# Patient Record
Sex: MALE | Race: OTHER | HISPANIC OR LATINO | Employment: FULL TIME | URBAN - METROPOLITAN AREA
[De-identification: names, ages, dates, MRNs, and addresses within clinical notes are randomized per-mention and may not be internally consistent; named-entity substitution may affect disease eponyms.]

---

## 2019-05-15 ENCOUNTER — NON-PROVIDER VISIT (OUTPATIENT)
Dept: OCCUPATIONAL MEDICINE | Facility: CLINIC | Age: 29
End: 2019-05-15
Payer: COMMERCIAL

## 2019-05-15 ENCOUNTER — HOSPITAL ENCOUNTER (EMERGENCY)
Facility: MEDICAL CENTER | Age: 29
End: 2019-05-15
Attending: EMERGENCY MEDICINE
Payer: COMMERCIAL

## 2019-05-15 VITALS
WEIGHT: 165.34 LBS | OXYGEN SATURATION: 96 % | HEIGHT: 66 IN | BODY MASS INDEX: 26.57 KG/M2 | DIASTOLIC BLOOD PRESSURE: 95 MMHG | SYSTOLIC BLOOD PRESSURE: 142 MMHG | RESPIRATION RATE: 14 BRPM | TEMPERATURE: 98.8 F | HEART RATE: 55 BPM

## 2019-05-15 DIAGNOSIS — Z02.83 ENCOUNTER FOR DRUG SCREENING: ICD-10-CM

## 2019-05-15 DIAGNOSIS — Z02.1 PRE-EMPLOYMENT DRUG SCREENING: ICD-10-CM

## 2019-05-15 DIAGNOSIS — T75.4XXA ELECTROCUTION AND NONFATAL EFFECTS OF ELECTRIC CURRENT, INITIAL ENCOUNTER: ICD-10-CM

## 2019-05-15 LAB
AMP AMPHETAMINE: NORMAL
ANION GAP SERPL CALC-SCNC: 11 MMOL/L (ref 0–11.9)
BREATH ALCOHOL COMMENT: NORMAL
BUN SERPL-MCNC: 16 MG/DL (ref 8–22)
CALCIUM SERPL-MCNC: 9.7 MG/DL (ref 8.5–10.5)
CHLORIDE SERPL-SCNC: 105 MMOL/L (ref 96–112)
CK SERPL-CCNC: 231 U/L (ref 0–154)
CO2 SERPL-SCNC: 25 MMOL/L (ref 20–33)
COC COCAINE: NORMAL
CREAT SERPL-MCNC: 0.81 MG/DL (ref 0.5–1.4)
EKG IMPRESSION: NORMAL
GLUCOSE SERPL-MCNC: 94 MG/DL (ref 65–99)
INT CON NEG: NEGATIVE
INT CON POS: POSITIVE
MET METHAMPHETAMINES: NORMAL
OPI OPIATES: NORMAL
PCP PHENCYCLIDINE: NORMAL
POC BREATHALIZER: 0 PERCENT (ref 0–0.01)
POC DRUG COMMENT 753798-OCCUPATIONAL HEALTH: NORMAL
POTASSIUM SERPL-SCNC: 3.4 MMOL/L (ref 3.6–5.5)
SODIUM SERPL-SCNC: 141 MMOL/L (ref 135–145)
THC: NORMAL

## 2019-05-15 PROCEDURE — 99283 EMERGENCY DEPT VISIT LOW MDM: CPT

## 2019-05-15 PROCEDURE — 36415 COLL VENOUS BLD VENIPUNCTURE: CPT

## 2019-05-15 PROCEDURE — 82550 ASSAY OF CK (CPK): CPT

## 2019-05-15 PROCEDURE — 99026 IN-HOSPITAL ON CALL SERVICE: CPT | Performed by: PREVENTIVE MEDICINE

## 2019-05-15 PROCEDURE — 80048 BASIC METABOLIC PNL TOTAL CA: CPT

## 2019-05-15 PROCEDURE — 82075 ASSAY OF BREATH ETHANOL: CPT | Performed by: PREVENTIVE MEDICINE

## 2019-05-15 PROCEDURE — 93005 ELECTROCARDIOGRAM TRACING: CPT | Performed by: EMERGENCY MEDICINE

## 2019-05-15 PROCEDURE — 80305 DRUG TEST PRSMV DIR OPT OBS: CPT | Performed by: PREVENTIVE MEDICINE

## 2019-05-15 NOTE — LETTER
"  FORM C-4:  EMPLOYEE’S CLAIM FOR COMPENSATION/ REPORT OF INITIAL TREATMENT  EMPLOYEE’S CLAIM - PROVIDE ALL INFORMATION REQUESTED   First Name  Wiliam Last Name  Ashraf Birthdate             Age  1990 29 y.o. 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 (if applicable)  Working/Opening A Slot Machine    How did this injury or occupational disease occur? Be specific and answer in detail. Use additional sheet if necessary)  I opened the slot machine and was shocked (120 Vac) once my left hand made contact with metal   If you believe that you have an occupational disease, when did you first have knowledge of the disability and it relationship to your employment?  N/A Witnesses to the Accident  N/A     Nature of Injury or Occupational Disease  Workers' Compensation  Part(s) of Body Injured or Affected  Internal Organs, N/A, N/A    I certify that the above is true and correct to the best of my knowledge and that I have provided this information in " order to obtain the benefits of [place]).  I hereby authorize any physician, chiropractor, surgeon, practitioner, or other person, any hospital, including [facility] or [facility] hospital, any medical service organization, any insurance company, or other institution or organization to release to each other, any medical or other information, including benefits paid or payable, pertinent to this injury or disease, except information relative to diagnosis, treatment and/or counseling for AIDS, psychological conditions, alcohol or controlled substances, for which I must give specific authorization.  A Photostat of this authorization shall be as valid as the original.   Date  5/15/2019 [facility] Employee’s Signature   THIS REPORT MUST BE COMPLETED AND MAILED WITHIN 3 WORKING DAYS OF TREATMENT   Place  [facility], EMERGENCY DEPT  Name of Facility   [facility]   Date  5/15/2019 Diagnosis  (T75.4XXA) Electrocution and nonfatal effects of electric current, initial encounter Is there evidence the injured employee was under the influence of alcohol and/or another controlled substance at the time of accident?   Hour  9:01 PM Description of Injury or Disease  Electrocution and nonfatal effects of electric current, initial encounter No   Treatment  Labs, follow up occupational health  Have you advised the patient to remain off work five days or more?         No   X-Ray Findings      If Yes   From Date    To Date      From information given by the employee, together with medical evidence, can you directly connect this injury or occupational disease as job incurred?  Yes If No, is the employee capable of: Full Duty  Yes Modified Duty      Is additional medical care by a physician indicated?  Yes If Modified Duty, Specify any Limitations /  "Restrictions        Do you know of any previous injury or disease contributing to this condition or occupational disease?  No   Date  5/15/2019 Print Doctor’s Name  Ruthie Jeffers certify the employer’s copy of this form was mailed on:   Address  [address] [address] [phone] Insurer’s Use Only   [facility]  [address]    Provider’s Tax ID Number  [id number] Telephone  Dept: [phone]    Doctor’s Signature  e-RUTHIE Palma M.D. Degree   MD    Original - TREATING PHYSICIAN OR CHIROPRACTOR   Pg 2-Insurer/TPA   Pg 3-Employer   Pg 4-Employee                                                                                                  Form C-4 (rev01/03)     BRIEF DESCRIPTION OF RIGHTS AND BENEFITS  (Pursuant to [place] 616[place])  Notice of Injury or Occupational Disease (Incident Report Form C-1): If an injury or occupational disease (OD) arises out of and in the course of employment, you must provide written notice to your employer as soon as practicable, but no later than 7 days after the accident or OD. Your employer shall maintain a sufficient supply of the required forms.  Claim for Compensation (Form C-4): If medical treatment is sought, the form C-4 is available at the place of initial treatment. A completed \"Claim for Compensation\" (Form C-4) must be filed within 90 days after an accident or OD. The treating physician or chiropractor must, within 3 working days after treatment, complete and mail to the employer, the employer's insurer and third-party , the Claim for Compensation.  Medical Treatment: If you require medical treatment for your on-the-job injury or OD, you may be required to select a physician or chiropractor from a list provided by your workers’ compensation insurer, if it has contracted with an Organization for Managed Care (MCO) or Preferred Provider Organization (PPO) or providers of health care. If your employer has not " entered into a contract with an MCO or PPO, you may select a physician or chiropractor from the Panel of Physicians and Chiropractors. Any medical costs related to your industrial injury or OD will be paid by your insurer.  Temporary Total Disability (TTD): If your doctor has certified that you are unable to work for a period of at least 5 consecutive days, or 5 cumulative days in a 20-day period, or places restrictions on you that your employer does not accommodate, you may be entitled to TTD compensation.  Temporary Partial Disability (TPD): If the wage you receive upon reemployment is less than the compensation for TTD to which you are entitled, the insurer may be required to pay you TPD compensation to make up the difference. TPD can only be paid for a maximum of 24 months.  Permanent Partial Disability (PPD): When your medical condition is stable and there is an indication of a PPD as a result of your injury or OD, within 30 days, your insurer must arrange for an evaluation by a rating physician or chiropractor to determine the degree of your PPD. The amount of your PPD award depends on the date of injury, the results of the PPD evaluation and your age and wage.  Permanent Total Disability (PTD): If you are medically certified by a treating physician or chiropractor as permanently and totally disabled and have been granted a PTD status by your insurer, you are entitled to receive monthly benefits not to exceed 66 2/3% of your average monthly wage. The amount of your PTD payments is subject to reduction if you previously received a PPD award.  Vocational Rehabilitation Services: You may be eligible for vocational rehabilitation services if you are unable to return to the job due to a permanent physical impairment or permanent restrictions as a result of your injury or occupational disease.  Transportation and Per [place] Reimbursement: You may be eligible for travel expenses and per jackson associated with medical  treatment.  Reopening: You may be able to reopen your claim if your condition worsens after claim closure.  Appeal Process: If you disagree with a written determination issued by the insurer or the insurer does not respond to your request, you may appeal to the Department of Administration, , by following the instructions contained in your determination letter. You must appeal the determination within 70 days from the date of the determination letter at [address], [address], [place], [place] [address], or [address], [place], [place] [address]. If you disagree with the  decision, you may appeal to the Department of Administration, . You must file your appeal within 30 days from the date of the  decision letter at [address], [address], [place], [place] [address], or [address], [place], [place] [address]. If you disagree with a decision of an , you may file a petition for judicial review with the District Court. You must do so within 30 days of the Appeal Officer’s decision. You may be represented by an  at your own expense or you may contact the [facility] for possible representation.  [place]  for Injured Workers (NAIW): If you disagree with a  decision, you may request that NAIW represent you without charge at an  Hearing. For information regarding denial of benefits, you may contact the [facility] at: [address], [address], [place] [address], [phone], or [address], [address], [place] [address], [phone]  To File a Complaint with the Division: If you wish to file a complaint with the  of the Division of Industrial Relations (DIR), please contact the Workers’ Compensation Section, [address], [address], [place], [place] [address], telephone [phone], or [address], [address], [place], [place] [address], telephone [phone].  For assistance with Workers’ Compensation Issues: you may contact the Office of the Governor Consumer Health Assistance, [address] [place] [address], [address], [place], [place] [address], Toll Free 1-246.736.9788, Web site: [email or web address] access [email or web address]., E-mail [email or web address]                                                                                                                                                                               __________________________________________________________________                                    _________________            Employee Name / Signature                                                                                                                            Date                                       D-2 (rev. 10/07)

## 2019-05-16 NOTE — DISCHARGE INSTRUCTIONS
Your labs are essentially within normal limits.  Your creatine kinase is a tiny bit elevated which results from muscle breakdown.  This is likely due to the electric shock.  However this slightly elevated level is not enough to warrant further intervention at this time.  It is important to stay well-hydrated.  Please follow-up with occupational health tomorrow.  Return for weakness, dizziness, or any other concerns.

## 2019-05-16 NOTE — ED TRIAGE NOTES
Chief Complaint   Patient presents with   • Electric Shock     Pt reports shocked yesterday with 120 volt to left hand. initially numbness to left arm, pain to left hand, not getting better since yesterday.     no wound seen at site of shock.  Explained to pt triage process, made pt aware to tell this RN/staff of any changes/concerns, pt verbalized understanding of process and instructions given. Pt to ER allan.

## 2019-05-16 NOTE — ED NOTES
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 -LOC.     He is now having pain in left hand and arm, up to elbow.

## 2019-05-16 NOTE — ED PROVIDER NOTES
"ED Provider Note    CHIEF COMPLAINT  Chief Complaint   Patient presents with   • Electric Shock     Pt reports shocked yesterday with 120 volt to left hand. initially numbness to left arm, pain to left hand, not getting better since yesterday.         HPI  Wiliam Ashraf is a 29 y.o. 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  Patient states that there was a bare wire exposure and he received 120 V electrical shock that lasted for about 5 seconds.  Felt fine afterwards but today has some feeling of left arm discomfort.  Initially had some numbness to the hand as well but that is resolved.  No chest pain, no palpitations, no shortness of breath.    REVIEW OF SYSTEMS  Pertinent positives: Left arm pain  Pertinent negatives: No numbness no palpitations no weakness no chest pain no shortness of breath  10 + systems reviewed and otherwise negative    PAST MEDICAL HISTORY       SOCIAL HISTORY  Social History     Social History Main Topics   • Smoking status: Never Smoker   • Smokeless tobacco: Never Used   • Alcohol use Yes      Comment: seldom   • Drug use: No   • Sexual activity: Not on file       SURGICAL HISTORY  patient denies any surgical history    CURRENT MEDICATIONS  Home Medications    **Home medications have not yet been reviewed for this encounter**         ALLERGIES  Allergies   Allergen Reactions   • Sudafed Hives and Shortness of Breath       PHYSICAL EXAM  VITAL SIGNS: BP (!) 164/83   Pulse 88   Temp 37.1 °C (98.8 °F) (Temporal)   Resp 15   Ht 1.676 m (5' 6\")   Wt 75 kg (165 lb 5.5 oz)   SpO2 99%   BMI 26.69 kg/m²   Pulse ox interpretation: I interpret this pulse ox as normal  Constitutional: Alert, in no apparent distress   HENT: Normocephalic, Atraumatic, Bilateral external ears normal. Nose normal. Moist mucous membranes.   Eyes: Pupils are equal and " reactive. Conjunctiva normal, non-icteric.   Heart: Regular rate and rythm, no murmurs. Radial pulses 2+  Lungs:  Clear to auscultation bilaterally. No increased work of breathing.   Abdomen: Soft, nontender, no rebound, no guarding.   Skin: Warm, Dry, No erythema, No rash.   Musculoskeletal: No obvious deformities, full range of motion of left shoulder, left elbow, left wrist, and left digits.  No abrasions or open wounds of left hand.  2+ radial pulses.  Sensation intact to pinprick.  Neurologic: Alert, Fluent speech. No facial droop. Grossly non-focal.   Psychiatric: Affect normal, Judgment normal, Mood normal, Appears appropriate and not intoxicated.     DIAGNOSTIC STUDIES / PROCEDURES    EKG  See below    LABS  Results for orders placed or performed during the hospital encounter of 05/15/19   BMP   Result Value Ref Range    Sodium 141 135 - 145 mmol/L    Potassium 3.4 (L) 3.6 - 5.5 mmol/L    Chloride 105 96 - 112 mmol/L    Co2 25 20 - 33 mmol/L    Glucose 94 65 - 99 mg/dL    Bun 16 8 - 22 mg/dL    Creatinine 0.81 0.50 - 1.40 mg/dL    Calcium 9.7 8.5 - 10.5 mg/dL    Anion Gap 11.0 0.0 - 11.9   CREATINE KINASE   Result Value Ref Range    CPK Total 231 (H) 0 - 154 U/L   ESTIMATED GFR   Result Value Ref Range    GFR If African American >60 >60 mL/min/1.73 m 2    GFR If Non African American >60 >60 mL/min/1.73 m 2   EKG   Result Value Ref Range    Report       [facility] Emergency Dept.    Test Date:  2019-05-15  Pt Name:    YADIEL FLORES                Department: ER  MRN:        [id number]                      Room:       DC 70  Gender:     Male                         Technician: [id number]  :        1990                   Requested By:RUTHIE JEFFERS  Order #:    [id number]                    Reading MD: Ruthie Jeffers MD    Measurements  Intervals                                Axis  Rate:       54                           P:          54  DC:         156                          QRS:         61  QRSD:       106                          T:          54  QT:         404  QTc:        383    Interpretive Statements  SINUS BRADYCARDIA  INCOMPLETE RIGHT BUNDLE BRANCH BLOCK  No previous ECG available for comparison    Electronically Signed On 5- 20:54:45 PDT by Ruthie Jeffers MD               COURSE & MEDICAL DECISION MAKING  Nursing notes and vital signs reviewed. Pertinent Labs & Imaging studies reviewed. 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  Now with persistent left arm discomfort.  Physical exam is notable for overall well-appearing, no swelling or deformities of the left arm, good range of motion of all joints, no open wounds.  Given electrical shock plan for CBC, chemistry, CK, as well as EKG for evaluation for rhabdomyolysis and cardiac conduction.  Final disposition and management pending above results.    20:30  Labs as above, notable for mild elevation of creatinine kinase.  Creatinine creatinine is within normal limits.  Remainder of electrolytes are essentially normal with potassium 3.4.  EKG within normal limits with normal intervals.  Plan for follow-up with occupational health tomorrow for reevaluation.  Mild elevation of CK with normal creatinine is reassuring, patient to be instructed to stay well-hydrated and follow-up tomorrow can determine need for repeat labs.  Patient discharged to home in improved condition with strict return precautions.      DISPOSITION:  Patient will be discharged home in stable condition.    FOLLOW UP:  Cecelia Aguilar  [address] [address]  [phone]            OUTPATIENT MEDICATIONS:  New Prescriptions    No medications on file       FINAL IMPRESSION  (T75.4XXA) Electrocution and nonfatal effects of electric current, initial encounter      Electronically signed by: Ruthie Jeffers, 5/15/2019 8:56 PM    This dictation was  created using voice recognition software. The accuracy of the dictation is limited to the abilities of the software. I expect there may be some errors of grammar and possibly content. The nursing notes were reviewed and certain aspects of this information were incorporated into this note.

## 2019-05-17 ENCOUNTER — APPOINTMENT (OUTPATIENT)
Dept: OCCUPATIONAL MEDICINE | Facility: CLINIC | Age: 29
End: 2019-05-17
Payer: COMMERCIAL

## 2019-05-22 ENCOUNTER — NON-PROVIDER VISIT (OUTPATIENT)
Dept: OCCUPATIONAL MEDICINE | Facility: CLINIC | Age: 29
End: 2019-05-22
Payer: COMMERCIAL

## 2019-05-22 DIAGNOSIS — Z02.83 ENCOUNTER FOR DRUG SCREENING: ICD-10-CM

## 2019-05-22 PROCEDURE — 8911 PR MRO FEE: Performed by: PREVENTIVE MEDICINE
